# Patient Record
Sex: FEMALE | Race: WHITE | NOT HISPANIC OR LATINO | Employment: OTHER | URBAN - METROPOLITAN AREA
[De-identification: names, ages, dates, MRNs, and addresses within clinical notes are randomized per-mention and may not be internally consistent; named-entity substitution may affect disease eponyms.]

---

## 2023-09-11 ENCOUNTER — HOSPITAL ENCOUNTER (EMERGENCY)
Facility: HOSPITAL | Age: 88
Discharge: HOME | End: 2023-09-11
Attending: EMERGENCY MEDICINE
Payer: MEDICARE

## 2023-09-11 VITALS
DIASTOLIC BLOOD PRESSURE: 83 MMHG | HEIGHT: 65 IN | OXYGEN SATURATION: 99 % | SYSTOLIC BLOOD PRESSURE: 187 MMHG | TEMPERATURE: 98 F | WEIGHT: 147.19 LBS | HEART RATE: 71 BPM | RESPIRATION RATE: 16 BRPM | BODY MASS INDEX: 24.52 KG/M2

## 2023-09-11 DIAGNOSIS — T07.XXXA ABRASIONS OF MULTIPLE SITES: ICD-10-CM
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
## 2023-09-11 DIAGNOSIS — S02.31XA CLOSED FRACTURE OF RIGHT ORBITAL FLOOR, INITIAL ENCOUNTER: Primary | ICD-10-CM

## 2023-09-11 DIAGNOSIS — H11.31 SCLERAL HEMORRHAGE, RIGHT: ICD-10-CM

## 2023-09-11 DIAGNOSIS — M25.561 RIGHT KNEE PAIN: ICD-10-CM

## 2023-09-11 DIAGNOSIS — S00.83XA FACIAL CONTUSION, INITIAL ENCOUNTER: ICD-10-CM

## 2023-09-11 PROCEDURE — 90715 TDAP VACCINE 7 YRS/> IM: CPT | Performed by: NURSE PRACTITIONER

## 2023-09-11 PROCEDURE — 99285 EMERGENCY DEPT VISIT HI MDM: CPT | Mod: 25

## 2023-09-11 PROCEDURE — 90471 IMMUNIZATION ADMIN: CPT | Performed by: NURSE PRACTITIONER

## 2023-09-11 PROCEDURE — 63600175 PHARM REV CODE 636 W HCPCS: Performed by: NURSE PRACTITIONER

## 2023-09-11 RX ORDER — CLINDAMYCIN HYDROCHLORIDE 300 MG/1
300 CAPSULE ORAL 3 TIMES DAILY
Qty: 21 CAPSULE | Refills: 0 | Status: SHIPPED | OUTPATIENT
Start: 2023-09-11 | End: 2023-09-18

## 2023-09-11 RX ADMIN — TETANUS TOXOID, REDUCED DIPHTHERIA TOXOID AND ACELLULAR PERTUSSIS VACCINE, ADSORBED 0.5 ML: 5; 2.5; 8; 8; 2.5 SUSPENSION INTRAMUSCULAR at 05:09

## 2023-09-11 NOTE — FIRST PROVIDER EVALUATION
Medical screening examination initiated.  I have conducted a focused provider triage encounter, findings are as follows:    Brief history of present illness:  right hand pain, right knee pain. Face and head pain after fall. Pt takes daily asa. Reports right eye pain    There were no vitals filed for this visit.    Pertinent physical exam:  nad    Brief workup plan:  imaging, further eval    Preliminary workup initiated; this workup will be continued and followed by the physician or advanced practice provider that is assigned to the patient when roomed.

## 2023-09-11 NOTE — ED PROVIDER NOTES
SCRIBE #1 NOTE: I, Marzena Rodriguez, am scribing for, and in the presence of, Belkys Sams MD. I have scribed the entire note.      History      Chief Complaint   Patient presents with    Fall     Pt fell today when her shoe string came loose and she tripped and fell face first. No loss of consciousness. C/o facial pain, right  and left hand pain, right knee pain.        Review of patient's allergies indicates:   Allergen Reactions    Penicillins Anaphylaxis    Ciprofloxacin Nausea Only        HPI   HPI    9/11/2023, 6:30 PM   History obtained from the patient      History of Present Illness: Bita Ann is a 89 y.o. female patient who presents to the Emergency Department for an evaluation because she had a trip and fall today. Pt reports that she was walking on her driveway when she stepped on her shoe lace and fell face first. She states that she hit her face on the concrete. She denies LOC. Pt is on an 81 mg ASA, but does not take any other blood thinners. She c/o bilateral hand pain, a headache, R periorbital swelling, R periorbital pain, epistaxis, R knee pain, and abrasions to the R knee and bilateral hands.  Symptoms are constant and moderate in severity. No mitigating or exacerbating factors reported. Patient denies any nausea, vomiting, neck pain, back pain, weakness, numbness, and all other sxs at this time. No prior Tx reported. No further complaints or concerns at this time.         Arrival mode: Personal vehicle    PCP: No primary care provider on file.       Past Medical History:  History reviewed. No pertinent past medical history.    Past Surgical History:  History reviewed. No pertinent surgical history.      Family History:  History reviewed. No pertinent family history.    Social History:  Social History     Tobacco Use    Smoking status: Never    Smokeless tobacco: Never   Substance and Sexual Activity    Alcohol use: Never    Drug use: Never    Sexual activity: Not on file       ROS   Review  of Systems   Constitutional:  Negative for fever.   HENT:  Positive for facial swelling (R periorbital) and nosebleeds. Negative for sore throat.         (+) R periorbital pain   Respiratory:  Negative for shortness of breath.    Cardiovascular:  Negative for chest pain.   Gastrointestinal:  Negative for nausea and vomiting.   Genitourinary:  Negative for dysuria.   Musculoskeletal:  Positive for arthralgias (bilateral hand and R knee). Negative for back pain and neck pain.   Skin:  Positive for wound (abrasions to the R knee and bilateral hands). Negative for rash.   Neurological:  Positive for headaches. Negative for weakness and numbness.        (-) LOC   Hematological:  Does not bruise/bleed easily.   All other systems reviewed and are negative.      Physical Exam      Initial Vitals [09/11/23 1735]   BP Pulse Resp Temp SpO2   (!) 194/70 82 16 98 °F (36.7 °C) 98 %      MAP       --          Physical Exam  Nursing Notes and Vital Signs Reviewed.  Constitutional: Patient is in no acute distress. Well-developed and well-nourished.  Head: There is soft tissue bruising to the R inferior orbital region and upper eyelid with reproducible tenderness. No step-offs. Normocephalic.  Eyes: PERRL. EOM intact. No subconjunctival hemorrhage. Conjunctivae are not pale. No scleral icterus.  ENT: Mucous membranes are moist. Oropharynx is clear and symmetric.  No active epistaxis. There is dried blood in the nares bilaterally.  Neck: Supple. Full ROM. No lymphadenopathy.  Cardiovascular: Regular rate. Regular rhythm. No murmurs, rubs, or gallops. Distal pulses are 2+ and symmetric.  Pulmonary/Chest: No respiratory distress. Clear to auscultation bilaterally. No wheezing or rales.  Abdominal: Soft and non-distended.  There is no tenderness.  No rebound, guarding, or rigidity.  Musculoskeletal: Moves all extremities. No obvious deformities. No edema. No bony deformities of the R knee or bilateral hands.  Skin: There are small  "abrasions to the R knee and bilateral hands.  Neurological:  Alert, awake, and appropriate.  Normal speech.  No acute focal neurological deficits are appreciated.  Psychiatric: Normal affect. Good eye contact. Appropriate in content.    ED Course    Procedures  ED Vital Signs:  Vitals:    09/11/23 1735 09/11/23 1843 09/11/23 1845   BP: (!) 194/70 (!) 187/83    Pulse: 82 71    Resp: 16     Temp: 98 °F (36.7 °C)     TempSrc: Oral     SpO2: 98% 99%    Weight: 66.7 kg (147 lb 2.5 oz)     Height:   5' 5" (1.651 m)       Abnormal Lab Results:  Labs Reviewed - No data to display       Imaging Results:  Imaging Results              X-Ray Knee Complete 4 Or More Views Right (Final result)  Result time 09/11/23 18:53:39      Final result by Greg Parker MD (09/11/23 18:53:39)                   Impression:      As above      Electronically signed by: Greg Parker  Date:    09/11/2023  Time:    18:53               Narrative:    EXAMINATION:  XR KNEE COMP 4 OR MORE VIEWS RIGHT    CLINICAL HISTORY:  Pain in right knee    TECHNIQUE:  AP, lateral, and Merchant views of the right knee were performed.    COMPARISON:  None    FINDINGS:  Tricompartment degenerative joint disease.  No acute fracture or dislocation.  Soft tissue prominence.                                       X-Ray Hand 3 View Bilateral (Final result)  Result time 09/11/23 18:41:47      Final result by Greg Parker MD (09/11/23 18:41:47)                   Impression:      No acute abnormality.  Senescent changes including osteopenia and degenerative joint disease      Electronically signed by: Greg Parker  Date:    09/11/2023  Time:    18:41               Narrative:    EXAMINATION:  XR HAND COMPLETE 3 VIEWS BILATERAL    CLINICAL HISTORY:  XR HAND COMPLETE 3 VIEWS BILATERAL    COMPARISON:  None    FINDINGS:  Multiple radiographic views  were obtained.    No evidence of acute fracture or dislocation.  A moderate degenerative joint disease particularly 1st " carpal/metacarpal and thumb interphalangeal joints.  Senescent changes including decreased bone mineral density.                                       CT Head Without Contrast (Final result)  Result time 09/11/23 18:18:04      Final result by Greg Parker MD (09/11/23 18:18:04)                   Impression:      As above    All CT scans   are performed using dose optimization techniques including the following: automated exposure control; adjustment of the mA and/or kV; use of iterative reconstruction technique.  Dose modulation was employed for ALARA by means of: Automated exposure control; adjustment of the mA and/or kV according to patient size (this includes techniques or standardized protocols for targeted exams where dose is matched to indication/reason for exam; i.e. extremities or head); and/or use of iterative reconstructive technique.      Electronically signed by: Greg Parker  Date:    09/11/2023  Time:    18:18               Narrative:    EXAMINATION:  CT HEAD WITHOUT CONTRAST    CLINICAL HISTORY:  Facial trauma, blunt;    TECHNIQUE:  Low dose axial CT images obtained throughout the head without intravenous contrast. Sagittal and coronal reconstructions were performed.    COMPARISON:  None.    FINDINGS:  Inferior orbital floor fracture.  Atrophy and chronic white matter changes.  No hemorrhage mass effect or midline shift.  Hemorrhagic material in the right maxillary sinus.                                       CT Maxillofacial Without Contrast (Final result)  Result time 09/11/23 18:21:53      Final result by Greg Parker MD (09/11/23 18:21:53)                   Impression:      As above      Electronically signed by: Greg Parker  Date:    09/11/2023  Time:    18:21               Narrative:    EXAMINATION:  CT MAXILLOFACIAL WITHOUT CONTRAST    CLINICAL HISTORY:  Facial trauma, blunt;    TECHNIQUE:  Low dose axial images, sagittal and coronal reformations were obtained through the face.   Contrast was not administered.    COMPARISON:  None    FINDINGS:  Comminuted fracture of the inferior lateral orbital wall.  Correlate clinically for entrapment of the right inferior rectus muscle.  Hemorrhagic material in the right maxillary sinus.  Atherosclerotic changes noted.                                              The Emergency Provider reviewed the vital signs and test results, which are outlined above.    ED Discussion     7:05 PM: Reassessed pt at this time. Discussed with pt all pertinent ED information and results. Discussed pt dx and plan of tx. Gave pt all f/u and return to the ED instructions. All questions and concerns were addressed at this time. Pt expresses understanding of information and instructions, and is comfortable with plan to discharge. Pt is stable for discharge.    I discussed with patient and/or family/caretaker that evaluation in the ED does not suggest any emergent or life threatening medical conditions requiring immediate intervention beyond what was provided in the ED, and I believe patient is safe for discharge.  Regardless, an unremarkable evaluation in the ED does not preclude the development or presence of a serious of life threatening condition. As such, patient was instructed to return immediately for any worsening or change in current symptoms.           ED Medication(s):  Medications   Tdap (BOOSTRIX) vaccine injection 0.5 mL (0.5 mLs Intramuscular Given 9/11/23 1746)     New Prescriptions    CLINDAMYCIN (CLEOCIN) 300 MG CAPSULE    Take 1 capsule (300 mg total) by mouth 3 (three) times daily. for 7 days        Follow-up Information       Otolaryngology. Schedule an appointment as soon as possible for a visit in 2 days.    Specialty: Otolaryngology  Why: Return to the emergency Room, If symptoms worsen  Contact information:  [id number] [facility] [id number] [phone]                             Medical Decision Making    Medical Decision  Making  Head Injury  ICH  Facial Fracture  Knee fracture/sprain    Had trip and fall, landed on face, positive facial contusion, no loc, no vomiting, had epistaxis but resolved, not on blood thinners, has facial contusion and periorbital contusion on exam but no entrapment, no vision changes, imaging reviewed and right inferior orbital wall fx noted, right maxillary sinus hemorrhage noted, no ICH, all other imaging otherwise normal, family at bedside, patient lives independently, no vomiting, GCS 15, no indication for lab work or admission, ENT Referral place, encouraged follow up, reasons to return given.     Amount and/or Complexity of Data Reviewed  Radiology: ordered. Decision-making details documented in ED Course.    Risk  Prescription drug management.                Scribe Attestation:   Scribe #1: I performed the above scribed service and the documentation accurately describes the services I performed. I attest to the accuracy of the note.    Attending:   Physician Attestation Statement for Scribe #1: I, Belkys Sams MD, personally performed the services described in this documentation, as scribed by Marzena Rodriguez, in my presence, and it is both accurate and complete.          Clinical Impression       ICD-10-CM ICD-9-CM   1. Closed fracture of right orbital floor, initial encounter  S02.31XA 802.6   2. Right knee pain  M25.561 719.46   3. Facial contusion, initial encounter  S00.83XA 920   4. Abrasions of multiple sites  T07.XXXA 919.0   5. Scleral hemorrhage, right  H11.31 372.72   6. Fall, initial encounter  W19.XXXA E888.9       Disposition:   Disposition: Discharged  Condition: Stable         Belkys Sams MD  09/11/23 1957

## 2023-09-26 ENCOUNTER — OFFICE VISIT (OUTPATIENT)
Dept: OTOLARYNGOLOGY | Facility: CLINIC | Age: 88
End: 2023-09-26
Payer: MEDICARE

## 2023-09-26 VITALS — WEIGHT: 147.06 LBS | BODY MASS INDEX: 24.47 KG/M2

## 2023-09-26 DIAGNOSIS — S02.31XA CLOSED FRACTURE OF RIGHT ORBITAL FLOOR, INITIAL ENCOUNTER: ICD-10-CM

## 2023-09-26 DIAGNOSIS — J34.2 NASAL SEPTAL DEVIATION: ICD-10-CM

## 2023-09-26 DIAGNOSIS — S02.2XXA CLOSED FRACTURE OF NASAL BONE, INITIAL ENCOUNTER: Primary | ICD-10-CM

## 2023-09-26 PROCEDURE — 99213 OFFICE O/P EST LOW 20 MIN: CPT | Mod: PBBFAC | Performed by: OTOLARYNGOLOGY

## 2023-09-26 PROCEDURE — 99999 PR PBB SHADOW E&M-EST. PATIENT-LVL III: CPT | Mod: PBBFAC,,, | Performed by: OTOLARYNGOLOGY

## 2023-09-26 PROCEDURE — 99999 PR PBB SHADOW E&M-EST. PATIENT-LVL III: ICD-10-PCS | Mod: PBBFAC,,, | Performed by: OTOLARYNGOLOGY

## 2023-09-26 PROCEDURE — 99204 PR OFFICE/OUTPT VISIT, NEW, LEVL IV, 45-59 MIN: ICD-10-PCS | Mod: S$PBB,,, | Performed by: OTOLARYNGOLOGY

## 2023-09-26 PROCEDURE — 99204 OFFICE O/P NEW MOD 45 MIN: CPT | Mod: S$PBB,,, | Performed by: OTOLARYNGOLOGY

## 2023-09-26 RX ORDER — LEVOTHYROXINE SODIUM 25 UG/1
25 TABLET ORAL
COMMUNITY
Start: 2023-08-09

## 2023-09-26 RX ORDER — AMLODIPINE BESYLATE 2.5 MG/1
2.5 TABLET ORAL
COMMUNITY
Start: 2023-07-05

## 2023-09-26 RX ORDER — OMEPRAZOLE 40 MG/1
CAPSULE, DELAYED RELEASE ORAL
COMMUNITY
Start: 2023-02-08

## 2023-09-26 RX ORDER — METOPROLOL SUCCINATE 50 MG/1
50 TABLET, EXTENDED RELEASE ORAL
COMMUNITY
Start: 2023-08-09

## 2023-09-26 RX ORDER — LOSARTAN POTASSIUM 100 MG/1
100 TABLET ORAL
COMMUNITY
Start: 2023-08-09

## 2023-09-26 RX ORDER — HYDROCHLOROTHIAZIDE 25 MG/1
TABLET ORAL
COMMUNITY

## 2023-09-26 NOTE — PROGRESS NOTES
"Referring Provider:    Belkys Sams Md  [address] [place] [address] [place] To [id number]  Subjective:   Patient: Bita Ann [id number], :11/15/1926   Visit date:2023 2:25 PM    Chief Complaint:  Other (Had a fall 2 weeks ago, fell and hit her face , real tender on left side , says she cant blow out of left side , hurts real bad in middle and unable to move it )    HPI:    Prior notes reviewed by myself.  Clinical documentation obtained by nursing staff reviewed.     89-year-old female presents for evaluation of recent blunt facial trauma and associated facial fracture.  She fell and landed on her face 2 weeks ago.  She denies any associated loss of consciousness.  She was subsequently seen in the emergency department and underwent a workup including CT maxillofacial which is below.  She reports congestion that is worse on the left side of her nose but otherwise is able to breathe through her nose.  She has not had any recent epistaxis.  She does report significant continued right-sided facial pain and tenderness.  She denies any significant diplopia or blurry vision.    Objective:     Physical Exam:  Vitals:  Wt 66.7 kg (147 lb 0.8 oz)   BMI 24.47 kg/m²   General appearance:  Well developed, well nourished    Ears:  Otoscopy of external auditory canals and tympanic membranes was normal, clinical speech reception thresholds grossly intact, no mass/lesion of auricle.    Nose:  No masses/lesions of external nose, nasal mucosa, septum deviated 2+ left, and turbinates were within normal limits.    Mouth:  No mass/lesion of lips, teeth, gums, hard/soft palate, tongue, tonsils, or oropharynx.    Neck & Lymphatics:  No cervical lymphadenopathy, no neck mass/crepitus/ asymmetry, trachea is midline, no thyroid enlargement/tenderness/mass.    Neuro:  CN II-IV intact bilaterally, Decreased sensation V2 right side, all other V branches intact, VI-XII intact    [x]  Data Reviewed:    No results found for: "WBC", " ""HGB", "HCT", "MCV", "LABPLAT", "EOSINOPHIL"      [x]  Independent interpretation of test: right orbital floor fx, nasal bone fx, possible septal fx  CT Maxillofacial Without Contrast  Order: [id number]  Status: Final result     Visible to patient: No (inaccessible in Patient Portal)     Next appt: None     0 Result Notes  Details    Reading Physician Reading Date Result Priority   Greg Parker MD  [phone] 9/11/2023 STAT     Narrative & Impression  EXAMINATION:  CT MAXILLOFACIAL WITHOUT CONTRAST     CLINICAL HISTORY:  Facial trauma, blunt;     TECHNIQUE:  Low dose axial images, sagittal and coronal reformations were obtained through the face.  Contrast was not administered.     COMPARISON:  None     FINDINGS:  Comminuted fracture of the inferior lateral orbital wall.  Correlate clinically for entrapment of the right inferior rectus muscle.  Hemorrhagic material in the right maxillary sinus.  Atherosclerotic changes noted.     Impression:     As above        Electronically signed by: Greg Parker  Date:                                            09/11/2023  Time:                                           18:21           Exam Ended: 09/11/23 18:03 Last Resulted: 09/11/23 18:21                   Assessment & Plan:   Closed fracture of nasal bone, initial encounter    Closed fracture of right orbital floor, initial encounter  -     Ambulatory referral/consult to ENT    Nasal septal deviation        We reviewed her history, exam, CT report and images in detail.  She does have a right orbital floor fracture on the images but she does not have any evidence of entrapment.  She has nasal bone fracture as well as a possible traumatic nasal septal fracture on my read of the images.  She has a patent nasal airway and at not see any issues that require surgical intervention.  Recommended saline nasal spray t.i.d. and follow-up with her established ENT, Dr. Sifuentes.      "

## 2024-04-01 ENCOUNTER — HOSPITAL ENCOUNTER (EMERGENCY)
Facility: HOSPITAL | Age: 89
Discharge: HOME | End: 2024-04-01
Attending: STUDENT IN AN ORGANIZED HEALTH CARE EDUCATION/TRAINING PROGRAM
Payer: MEDICARE

## 2024-04-01 VITALS
BODY MASS INDEX: 24.94 KG/M2 | OXYGEN SATURATION: 96 % | WEIGHT: 149.69 LBS | HEIGHT: 65 IN | TEMPERATURE: 98 F | RESPIRATION RATE: 18 BRPM | HEART RATE: 56 BPM | DIASTOLIC BLOOD PRESSURE: 58 MMHG | SYSTOLIC BLOOD PRESSURE: 116 MMHG

## 2024-04-01 DIAGNOSIS — L03.116 CELLULITIS OF LEFT LOWER EXTREMITY: Primary | ICD-10-CM

## 2024-04-01 DIAGNOSIS — I10 HYPERTENSION: ICD-10-CM

## 2024-04-01 LAB
ALBUMIN SERPL BCP-MCNC: 3.7 G/DL (ref 3.5–5.2)
ALP SERPL-CCNC: 118 U/L (ref 55–135)
ALT SERPL W/O P-5'-P-CCNC: 11 U/L (ref 10–44)
ANION GAP SERPL CALC-SCNC: 12 MMOL/L (ref 8–16)
AST SERPL-CCNC: 17 U/L (ref 10–40)
BASOPHILS # BLD AUTO: 0.04 K/UL (ref 0–0.2)
BASOPHILS NFR BLD: 0.6 % (ref 0–1.9)
BILIRUB SERPL-MCNC: 0.4 MG/DL (ref 0.1–1)
BNP SERPL-MCNC: 244 PG/ML (ref 0–99)
BUN SERPL-MCNC: 24 MG/DL (ref 10–30)
CALCIUM SERPL-MCNC: 9.6 MG/DL (ref 8.7–10.5)
CHLORIDE SERPL-SCNC: 110 MMOL/L (ref 95–110)
CO2 SERPL-SCNC: 21 MMOL/L (ref 23–29)
CREAT SERPL-MCNC: 1.4 MG/DL (ref 0.5–1.4)
DIFFERENTIAL METHOD BLD: ABNORMAL
EOSINOPHIL # BLD AUTO: 0 K/UL (ref 0–0.5)
EOSINOPHIL NFR BLD: 0 % (ref 0–8)
ERYTHROCYTE [DISTWIDTH] IN BLOOD BY AUTOMATED COUNT: 13.7 % (ref 11.5–14.5)
EST. GFR  (NO RACE VARIABLE): 34 ML/MIN/1.73 M^2
GLUCOSE SERPL-MCNC: 113 MG/DL (ref 70–110)
HCT VFR BLD AUTO: 34 % (ref 37–48.5)
HCV AB SERPL QL IA: NEGATIVE
HEP C VIRUS HOLD SPECIMEN: NORMAL
HGB BLD-MCNC: 10.9 G/DL (ref 12–16)
HIV 1+2 AB+HIV1 P24 AG SERPL QL IA: NEGATIVE
IMM GRANULOCYTES # BLD AUTO: 0.02 K/UL (ref 0–0.04)
IMM GRANULOCYTES NFR BLD AUTO: 0.3 % (ref 0–0.5)
LYMPHOCYTES # BLD AUTO: 3.1 K/UL (ref 1–4.8)
LYMPHOCYTES NFR BLD: 45.6 % (ref 18–48)
MCH RBC QN AUTO: 29.1 PG (ref 27–31)
MCHC RBC AUTO-ENTMCNC: 32.1 G/DL (ref 32–36)
MCV RBC AUTO: 91 FL (ref 82–98)
MONOCYTES # BLD AUTO: 0.4 K/UL (ref 0.3–1)
MONOCYTES NFR BLD: 6 % (ref 4–15)
NEUTROPHILS # BLD AUTO: 3.3 K/UL (ref 1.8–7.7)
NEUTROPHILS NFR BLD: 47.5 % (ref 38–73)
NRBC BLD-RTO: 0 /100 WBC
PLATELET # BLD AUTO: 327 K/UL (ref 150–450)
PMV BLD AUTO: 8.3 FL (ref 9.2–12.9)
POTASSIUM SERPL-SCNC: 3.9 MMOL/L (ref 3.5–5.1)
PROT SERPL-MCNC: 7.4 G/DL (ref 6–8.4)
RBC # BLD AUTO: 3.75 M/UL (ref 4–5.4)
SODIUM SERPL-SCNC: 143 MMOL/L (ref 136–145)
WBC # BLD AUTO: 6.88 K/UL (ref 3.9–12.7)

## 2024-04-01 PROCEDURE — 80053 COMPREHEN METABOLIC PANEL: CPT | Performed by: NURSE PRACTITIONER

## 2024-04-01 PROCEDURE — 85025 COMPLETE CBC W/AUTO DIFF WBC: CPT | Performed by: NURSE PRACTITIONER

## 2024-04-01 PROCEDURE — 93010 ELECTROCARDIOGRAM REPORT: CPT | Mod: ,,, | Performed by: INTERNAL MEDICINE

## 2024-04-01 PROCEDURE — 99284 EMERGENCY DEPT VISIT MOD MDM: CPT | Mod: 25

## 2024-04-01 PROCEDURE — 93005 ELECTROCARDIOGRAM TRACING: CPT

## 2024-04-01 PROCEDURE — 25000003 PHARM REV CODE 250: Performed by: STUDENT IN AN ORGANIZED HEALTH CARE EDUCATION/TRAINING PROGRAM

## 2024-04-01 PROCEDURE — 87389 HIV-1 AG W/HIV-1&-2 AB AG IA: CPT | Performed by: EMERGENCY MEDICINE

## 2024-04-01 PROCEDURE — 83880 ASSAY OF NATRIURETIC PEPTIDE: CPT | Performed by: NURSE PRACTITIONER

## 2024-04-01 PROCEDURE — 86803 HEPATITIS C AB TEST: CPT | Performed by: EMERGENCY MEDICINE

## 2024-04-01 PROCEDURE — 25000003 PHARM REV CODE 250: Performed by: NURSE PRACTITIONER

## 2024-04-01 RX ORDER — SULFAMETHOXAZOLE AND TRIMETHOPRIM 800; 160 MG/1; MG/1
1 TABLET ORAL
Status: COMPLETED | OUTPATIENT
Start: 2024-04-01 | End: 2024-04-01

## 2024-04-01 RX ORDER — CLONIDINE HYDROCHLORIDE 0.2 MG/1
0.2 TABLET ORAL
Status: COMPLETED | OUTPATIENT
Start: 2024-04-01 | End: 2024-04-01

## 2024-04-01 RX ORDER — SULFAMETHOXAZOLE AND TRIMETHOPRIM 800; 160 MG/1; MG/1
1 TABLET ORAL 2 TIMES DAILY
Qty: 14 TABLET | Refills: 0 | Status: SHIPPED | OUTPATIENT
Start: 2024-04-01 | End: 2024-04-08

## 2024-04-01 RX ADMIN — SULFAMETHOXAZOLE AND TRIMETHOPRIM 1 TABLET: 800; 160 TABLET ORAL at 10:04

## 2024-04-01 RX ADMIN — CLONIDINE HYDROCHLORIDE 0.2 MG: 0.2 TABLET ORAL at 09:04

## 2024-04-02 LAB
OHS QRS DURATION: 118 MS
OHS QTC CALCULATION: 489 MS

## 2024-04-02 NOTE — ED PROVIDER NOTES
ED Provider Note - 4/1/2024    History     Chief Complaint   Patient presents with    Leg Pain     Patient presents to ED with c/o BLE pain, swelling and scaling rash x 2 weeks.        The history is provided by the patient.        Bita Ann is a 89 y.o. year old female with past medical and surgical history as seen below, presenting with chief complaint of BLE pain which onset two weeks ago. Symptoms are constant and moderate in severity. Scaly abscess and erythema are present on the anterior LLE. Pt. went to a dermatologist who proscribed a topical ointment which was unsuccessful in subsiding symptoms. Irritation and pain is exacerbated after a fall where she scraped some skin off. The patient adds that she has chronic swelling to the BLE. She has tried wearing compression stockings, which she did not tolerate due to pain. No further complaints or concerns at this time.       No past medical history on file.  No past surgical history on file.      No family history on file.  Social History     Tobacco Use    Smoking status: Never    Smokeless tobacco: Never   Substance Use Topics    Alcohol use: Never    Drug use: Never     Social Determinants of Health with Concerns     Alcohol Use: Not on file   Financial Resource Strain: Not on file   Food Insecurity: Not on file   Transportation Needs: Not on file   Physical Activity: Not on file   Stress: Not on file   Social Connections: Not on file   Housing Stability: Not on file   Depression: Not on file      Review of patient's allergies indicates:   Allergen Reactions    Penicillins Anaphylaxis    Ciprofloxacin Nausea Only       Review of Systems     A full Review of Systems (ROS) was performed and was negative unless otherwise stated in the HPI.      Physical Exam     Vitals:    04/01/24 2039 04/01/24 2150 04/01/24 2241   BP: (S) (!) 237/98  Comment: 227/112 Initially (!) 185/80 (!) 116/58   BP Location: Left arm Left arm Left arm   Patient Position: Sitting  "Sitting Sitting   Pulse: 69 (!) 58 (!) 56   Resp: 18 18 18   Temp: 97.9 °F (36.6 °C)     TempSrc: Oral     SpO2: 100% 98% 96%   Weight: 67.9 kg (149 lb 11.1 oz)     Height: 5' 5" (1.651 m)          Physical Exam    Nursing note and vitals reviewed.  Constitutional: She appears well-developed and well-nourished. No distress.   HENT:   Head: Normocephalic and atraumatic.   Right Ear: External ear normal.   Left Ear: External ear normal.   Nose: Nose normal.   Eyes: Conjunctivae and EOM are normal. Pupils are equal, round, and reactive to light.   Neck: Neck supple.   Normal range of motion.  Cardiovascular:  Normal rate and regular rhythm.           Pulmonary/Chest: Breath sounds normal. No respiratory distress.   Musculoskeletal:         General: Normal range of motion.      Cervical back: Normal range of motion and neck supple.      Right lower le+ Edema present.      Left lower le+ Edema present.     Neurological: She is alert and oriented to person, place, and time. She has normal strength. No cranial nerve deficit or sensory deficit. GCS eye subscore is 4. GCS verbal subscore is 5. GCS motor subscore is 6.   Skin: Skin is warm and dry.   Scaly rash over the anterior LLE with erythematous base and weeping   Psychiatric: She has a normal mood and affect. Thought content normal.         Lab Results- Independently reviewed by myself      Labs Reviewed   CBC W/ AUTO DIFFERENTIAL - Abnormal; Notable for the following components:       Result Value    RBC 3.75 (*)     Hemoglobin 10.9 (*)     Hematocrit 34.0 (*)     MPV 8.3 (*)     All other components within normal limits    Narrative:     Release to patient->Immediate   COMPREHENSIVE METABOLIC PANEL - Abnormal; Notable for the following components:    CO2 21 (*)     Glucose 113 (*)     eGFR 34 (*)     All other components within normal limits    Narrative:     Release to patient->Immediate   B-TYPE NATRIURETIC PEPTIDE - Abnormal; Notable for the following " components:     (*)     All other components within normal limits    Narrative:     Release to patient->Immediate   HIV 1 / 2 ANTIBODY    Narrative:     Release to patient->Immediate   HEPATITIS C ANTIBODY    Narrative:     Release to patient->Immediate   HEP C VIRUS HOLD SPECIMEN    Narrative:     Release to patient->Immediate           Imaging     Imaging Results    None           EKG Readings: (Independently Interpreted)   Interpretation time: 20:39  Rate: 71 BPM  Rhythm: normal sinus rhythm  Interpretation: Incomplete right bundle branch block. Left anterior fascicular block. No STEMI.               ED Course         Procedures         Orders Placed This Encounter    CBC auto differential    Comprehensive metabolic panel    Brain natriuretic peptide    HIV 1/2 Ag/Ab (4th Gen)    Hepatitis C Antibody    HCV Virus Hold Specimen    Apply ace wrap    EKG 12-lead    Saline lock IV    cloNIDine tablet 0.2 mg    sulfamethoxazole-trimethoprim 800-160mg per tablet 1 tablet    sulfamethoxazole-trimethoprim 800-160mg (BACTRIM DS) 800-160 mg Tab                      Medical Decision Making       The patient's list of active medical problems, social history, medications, and allergies as documented per RN staff has been reviewed.           Medical Decision Making  This patient presents for evaluation of left LE redness and pain. Overall, patient is nontoxic appearing with stable VS. No lymphangitic spread visible. No fluid pockets or fluctuance concerning for abscess noted. S/S are most concerning for cellulitis. Full DDX includes abscess, DVT, burn, MSK pain, and other nonemergent causes of rash.    Patient prescribed Bactrim for symptoms of likely cellulitis.    Close follow-up with PCP advised. Return to the ED for reevaluation should symptoms worsen, return, or change in character.      Amount and/or Complexity of Data Reviewed  External Data Reviewed: ECG and notes.  Labs: ordered. Decision-making details  documented in ED Course.  ECG/medicine tests: ordered and independent interpretation performed. Decision-making details documented in ED Course.  Discussion of management or test interpretation with external provider(s):   3:27 AM: Reassessed pt at this time. Discussed with pt all pertinent ED information and results. Discussed pt dx and plan of tx. Gave pt all f/u and return to the ED instructions. All questions and concerns were addressed at this time. Pt expresses understanding of information and instructions, and is comfortable with plan to discharge. Pt is stable for discharge.    I discussed with patient and/or family/caretaker that evaluation in the ED does not suggest any emergent or life threatening medical conditions requiring immediate intervention beyond what was provided in the ED, and I believe patient is safe for discharge.  Regardless, an unremarkable evaluation in the ED does not preclude the development or presence of a serious of life threatening condition. As such, patient was instructed to return immediately for any worsening or change in current symptoms.        Risk  Prescription drug management.              SCRIBE #1 NOTE: I, Chi Xavier, am scribing for, and in the presence of,  Fabrice Villalobos MD. I have scribed the entire note.          ED Prescriptions       Medication Sig Dispense Start Date End Date Auth. Provider    sulfamethoxazole-trimethoprim 800-160mg (BACTRIM DS) 800-160 mg Tab Take 1 tablet by mouth 2 (two) times daily. for 7 days 14 tablet 4/1/2024 4/8/2024 Fabrice Villalobos MD              Clinical Impression       Follow-up Information       Follow up With Specialties Details Why Contact Info    Primary care provider of your choice  Schedule an appointment as soon as possible for a visit in 3 days For follow-up on today's visit.     [facility] - Emergency Dept. Emergency Medicine Go to  As needed, If symptoms worsen [id number]  [facility]  [id number]  [phone]            Referrals:  No orders of the defined types were placed in this encounter.      Disposition   ED Disposition Condition    Discharge Stable            Diagnosis    ICD-10-CM ICD-9-CM   1. Cellulitis of left lower extremity  L03.116 682.6   2. Hypertension  I10 401.9           Fabrice Villalobos MD        04/01/2024          DISCLAIMER: This note was prepared with [employer] voice recognition transcription software. Garbled syntax, mangled pronouns, and other bizarre constructions may be attributed to that software system.        Fabrice Villalobos MD  04/04/24 0109

## 2024-04-02 NOTE — FIRST PROVIDER EVALUATION
Medical screening examination initiated.  I have conducted a focused provider triage encounter, findings are as follows:    Brief history of present illness:  Complains redness, drainage, and swelling to bilateral lower extremity for 2 weeks.    There were no vitals filed for this visit.    Pertinent physical exam:  Drainage, swelling, and erythema to bilateral lower extremities.  Hypertensive.    Brief workup plan:  Labs    Preliminary workup initiated; this workup will be continued and followed by the physician or advanced practice provider that is assigned to the patient when roomed.

## 2025-05-12 ENCOUNTER — HOSPITAL ENCOUNTER (EMERGENCY)
Facility: HOSPITAL | Age: OVER 89
Discharge: HOME | End: 2025-05-12
Attending: EMERGENCY MEDICINE
Payer: MEDICARE

## 2025-05-12 VITALS
OXYGEN SATURATION: 98 % | HEART RATE: 71 BPM | TEMPERATURE: 99 F | DIASTOLIC BLOOD PRESSURE: 78 MMHG | HEIGHT: 62 IN | SYSTOLIC BLOOD PRESSURE: 168 MMHG | BODY MASS INDEX: 27.38 KG/M2 | RESPIRATION RATE: 18 BRPM

## 2025-05-12 DIAGNOSIS — R05.9 COUGH, UNSPECIFIED TYPE: ICD-10-CM

## 2025-05-12 DIAGNOSIS — J30.9 ALLERGIC RHINITIS, UNSPECIFIED SEASONALITY, UNSPECIFIED TRIGGER: Primary | ICD-10-CM

## 2025-05-12 DIAGNOSIS — Z13.6 SCREENING FOR CARDIOVASCULAR CONDITION: ICD-10-CM

## 2025-05-12 DIAGNOSIS — R03.0 ELEVATED BLOOD PRESSURE READING: ICD-10-CM

## 2025-05-12 LAB
ABSOLUTE EOSINOPHIL (OHS): 0.12 K/UL
ABSOLUTE MONOCYTE (OHS): 0.38 K/UL (ref 0.3–1)
ABSOLUTE NEUTROPHIL COUNT (OHS): 3.37 K/UL (ref 1.8–7.7)
ALBUMIN SERPL BCP-MCNC: 3.8 G/DL (ref 3.5–5.2)
ALP SERPL-CCNC: 86 UNIT/L (ref 40–150)
ALT SERPL W/O P-5'-P-CCNC: 8 UNIT/L (ref 10–44)
AMM URATE CRY UR QL COMP ASSIST: ABNORMAL
AMORPH CRY UR QL COMP ASSIST: ABNORMAL
ANION GAP (OHS): 11 MMOL/L (ref 8–16)
AST SERPL-CCNC: 15 UNIT/L (ref 11–45)
BACTERIA #/AREA URNS AUTO: ABNORMAL /HPF
BASOPHILS # BLD AUTO: 0.05 K/UL
BASOPHILS NFR BLD AUTO: 0.7 %
BILIRUB SERPL-MCNC: 0.3 MG/DL (ref 0.1–1)
BILIRUB UR QL STRIP.AUTO: NEGATIVE
BUN SERPL-MCNC: 20 MG/DL (ref 10–30)
CA CARBONATE CRY UR QL COMP ASSIST: ABNORMAL
CA PHOS CRY UR QL COMP ASSIST: ABNORMAL
CALCIUM SERPL-MCNC: 9.4 MG/DL (ref 8.7–10.5)
CAOX CRY UR QL COMP ASSIST: ABNORMAL
CHLORIDE SERPL-SCNC: 105 MMOL/L (ref 95–110)
CLARITY UR: CLEAR
CO2 SERPL-SCNC: 23 MMOL/L (ref 23–29)
COLOR UR AUTO: YELLOW
CREAT SERPL-MCNC: 1.3 MG/DL (ref 0.5–1.4)
EPITH CASTS #/AREA UR COMP ASSIST: 0 /LPF (ref ?–0)
ERYTHROCYTE [DISTWIDTH] IN BLOOD BY AUTOMATED COUNT: 13.1 % (ref 11.5–14.5)
FATTY CASTS UR QL COMP ASSIST: 0 /LPF (ref ?–0)
GFR SERPLBLD CREATININE-BSD FMLA CKD-EPI: 37 ML/MIN/1.73/M2
GLUCOSE SERPL-MCNC: 122 MG/DL (ref 70–110)
GLUCOSE UR QL STRIP: NEGATIVE
GRAN CASTS UR QL COMP ASSIST: 0 /LPF (ref ?–0)
HCT VFR BLD AUTO: 38.7 % (ref 37–48.5)
HCV AB SERPL QL IA: NEGATIVE
HGB BLD-MCNC: 12.4 GM/DL (ref 12–16)
HGB UR QL STRIP: NEGATIVE
HIV 1+2 AB+HIV1 P24 AG SERPL QL IA: NEGATIVE
HOLD SPECIMEN: NORMAL
HOLD SPECIMEN: NORMAL
HYALINE CASTS UR QL AUTO: 1 /LPF (ref 0–1)
IMM GRANULOCYTES # BLD AUTO: 0.01 K/UL (ref 0–0.04)
IMM GRANULOCYTES NFR BLD AUTO: 0.1 % (ref 0–0.5)
INFLUENZA A MOLECULAR (OHS): NEGATIVE
INFLUENZA B MOLECULAR (OHS): NEGATIVE
KETONES UR QL STRIP: NEGATIVE
LACTATE SERPL-SCNC: 1.2 MMOL/L (ref 0.5–2.2)
LEUKOCYTE ESTERASE UR QL STRIP: ABNORMAL
LYMPHOCYTES # BLD AUTO: 3.47 K/UL (ref 1–4.8)
MCH RBC QN AUTO: 28.9 PG (ref 27–31)
MCHC RBC AUTO-ENTMCNC: 32 G/DL (ref 32–36)
MCV RBC AUTO: 90 FL (ref 82–98)
MICROSCOPIC COMMENT: ABNORMAL
NITRITE UR QL STRIP: NEGATIVE
NON-SQ EPI CELLS #/AREA URNS AUTO: 0 /HPF
NUCLEATED RBC (/100WBC) (OHS): 0 /100 WBC
OTHER ELEMENTS URNS MICRO: ABNORMAL
PH UR STRIP: 6 [PH]
PLATELET # BLD AUTO: 323 K/UL (ref 150–450)
PMV BLD AUTO: 8.6 FL (ref 9.2–12.9)
POTASSIUM SERPL-SCNC: 4.4 MMOL/L (ref 3.5–5.1)
PROT SERPL-MCNC: 8 GM/DL (ref 6–8.4)
PROT UR QL STRIP: ABNORMAL
RBC # BLD AUTO: 4.29 M/UL (ref 4–5.4)
RBC #/AREA URNS AUTO: <1 /HPF (ref 0–4)
RBC CASTS UR QL COMP ASSIST: 0 /LPF (ref ?–0)
RELATIVE EOSINOPHIL (OHS): 1.6 %
RELATIVE LYMPHOCYTE (OHS): 46.9 % (ref 18–48)
RELATIVE MONOCYTE (OHS): 5.1 % (ref 4–15)
RELATIVE NEUTROPHIL (OHS): 45.6 % (ref 38–73)
SARS-COV-2 RDRP RESP QL NAA+PROBE: NEGATIVE
SODIUM SERPL-SCNC: 139 MMOL/L (ref 136–145)
SP GR UR STRIP: 1.02
SQUAMOUS #/AREA URNS AUTO: 2 /HPF
TRI-PHOS CRY UR QL COMP ASSIST: ABNORMAL
TRICHOMONAS UR QL COMP ASSIST: ABNORMAL /HPF
UNSPECIFIED CRY UR QL COMP ASSIST: ABNORMAL
URATE CRY UR QL COMP ASSIST: ABNORMAL
UROBILINOGEN UR STRIP-ACNC: NEGATIVE EU/DL
WAXY CASTS UR QL COMP ASSIST: 0 /LPF (ref ?–0)
WBC # BLD AUTO: 7.4 K/UL (ref 3.9–12.7)
WBC #/AREA URNS AUTO: 10 /HPF (ref 0–5)
WBC CASTS UR QL COMP ASSIST: 0 /LPF (ref ?–0)
WBC CLUMPS UR QL AUTO: ABNORMAL
YEAST UR QL AUTO: ABNORMAL /HPF

## 2025-05-12 PROCEDURE — 80053 COMPREHEN METABOLIC PANEL: CPT | Performed by: NURSE PRACTITIONER

## 2025-05-12 PROCEDURE — 63600175 PHARM REV CODE 636 W HCPCS: Performed by: NURSE PRACTITIONER

## 2025-05-12 PROCEDURE — U0002 COVID-19 LAB TEST NON-CDC: HCPCS | Performed by: NURSE PRACTITIONER

## 2025-05-12 PROCEDURE — 83605 ASSAY OF LACTIC ACID: CPT | Performed by: NURSE PRACTITIONER

## 2025-05-12 PROCEDURE — 63600175 PHARM REV CODE 636 W HCPCS: Performed by: EMERGENCY MEDICINE

## 2025-05-12 PROCEDURE — 87040 BLOOD CULTURE FOR BACTERIA: CPT | Performed by: NURSE PRACTITIONER

## 2025-05-12 PROCEDURE — 96361 HYDRATE IV INFUSION ADD-ON: CPT

## 2025-05-12 PROCEDURE — 93010 ELECTROCARDIOGRAM REPORT: CPT | Mod: ,,, | Performed by: INTERNAL MEDICINE

## 2025-05-12 PROCEDURE — 87389 HIV-1 AG W/HIV-1&-2 AB AG IA: CPT | Performed by: EMERGENCY MEDICINE

## 2025-05-12 PROCEDURE — 96374 THER/PROPH/DIAG INJ IV PUSH: CPT

## 2025-05-12 PROCEDURE — 86803 HEPATITIS C AB TEST: CPT | Performed by: EMERGENCY MEDICINE

## 2025-05-12 PROCEDURE — 99285 EMERGENCY DEPT VISIT HI MDM: CPT | Mod: 25

## 2025-05-12 PROCEDURE — 93005 ELECTROCARDIOGRAM TRACING: CPT

## 2025-05-12 PROCEDURE — 87502 INFLUENZA DNA AMP PROBE: CPT | Performed by: NURSE PRACTITIONER

## 2025-05-12 PROCEDURE — 25000003 PHARM REV CODE 250: Performed by: EMERGENCY MEDICINE

## 2025-05-12 PROCEDURE — 85025 COMPLETE CBC W/AUTO DIFF WBC: CPT | Performed by: NURSE PRACTITIONER

## 2025-05-12 PROCEDURE — 81003 URINALYSIS AUTO W/O SCOPE: CPT | Performed by: NURSE PRACTITIONER

## 2025-05-12 RX ORDER — PREDNISONE 20 MG/1
40 TABLET ORAL
Status: COMPLETED | OUTPATIENT
Start: 2025-05-12 | End: 2025-05-12

## 2025-05-12 RX ORDER — ONDANSETRON HYDROCHLORIDE 2 MG/ML
4 INJECTION, SOLUTION INTRAVENOUS
Status: COMPLETED | OUTPATIENT
Start: 2025-05-12 | End: 2025-05-12

## 2025-05-12 RX ORDER — OXYMETAZOLINE HCL 0.05 %
2 SPRAY, NON-AEROSOL (ML) NASAL
Status: COMPLETED | OUTPATIENT
Start: 2025-05-12 | End: 2025-05-12

## 2025-05-12 RX ORDER — ONDANSETRON HYDROCHLORIDE 2 MG/ML
4 INJECTION, SOLUTION INTRAVENOUS
Status: DISCONTINUED | OUTPATIENT
Start: 2025-05-12 | End: 2025-05-12

## 2025-05-12 RX ORDER — BENZONATATE 100 MG/1
200 CAPSULE ORAL 3 TIMES DAILY PRN
Qty: 20 CAPSULE | Refills: 0 | Status: SHIPPED | OUTPATIENT
Start: 2025-05-12 | End: 2025-05-22

## 2025-05-12 RX ORDER — AMLODIPINE BESYLATE 5 MG/1
10 TABLET ORAL
Status: COMPLETED | OUTPATIENT
Start: 2025-05-12 | End: 2025-05-12

## 2025-05-12 RX ORDER — PREDNISONE 20 MG/1
40 TABLET ORAL DAILY
Qty: 8 TABLET | Refills: 0 | Status: SHIPPED | OUTPATIENT
Start: 2025-05-12 | End: 2025-05-16

## 2025-05-12 RX ADMIN — SODIUM CHLORIDE 500 ML: 9 INJECTION, SOLUTION INTRAVENOUS at 07:05

## 2025-05-12 RX ADMIN — Medication 2 SPRAY: at 07:05

## 2025-05-12 RX ADMIN — AMLODIPINE BESYLATE 10 MG: 5 TABLET ORAL at 08:05

## 2025-05-12 RX ADMIN — ONDANSETRON 4 MG: 2 INJECTION INTRAMUSCULAR; INTRAVENOUS at 07:05

## 2025-05-12 RX ADMIN — PREDNISONE 40 MG: 20 TABLET ORAL at 08:05

## 2025-05-12 NOTE — FIRST PROVIDER EVALUATION
"Medical screening examination initiated.  I have conducted a focused provider triage encounter, findings are as follows:    Brief history of present illness:  reports cough and feeling weak. Reports decreased appetite with congestion and fever      Vitals:    05/12/25 1801   BP: (!) 175/82   BP Location: Right arm   Pulse: 85   Resp: 16   Temp: 99.2 °F (37.3 °C)   TempSrc: Oral   SpO2: (!) 94%   Height: 5' 2" (1.575 m)       Pertinent physical exam:  nad    Brief workup plan:  labs, meds, imaging, further eval     Preliminary workup initiated; this workup will be continued and followed by the physician or advanced practice provider that is assigned to the patient when roomed.  "

## 2025-05-13 LAB
OHS QRS DURATION: 126 MS
OHS QTC CALCULATION: 473 MS

## 2025-05-13 NOTE — DISCHARGE INSTRUCTIONS
Prednisone as prescribed.  Use Tessalon Perles for cough.  You may take a Zyrtec, Allegra or Claritin daily as an antihistamine.  This should help you with your symptoms.  Follow up with her doctor in 1-2 days for re-evaluation and return as needed for any worsening symptoms, problems, questions or concerns

## 2025-05-13 NOTE — ED PROVIDER NOTES
"SCRIBE #1 NOTE: I, Alphonso Osorio, am scribing for, and in the presence of, Tanner Snider Jr., MD. I have scribed the entire note.       History     Chief Complaint   Patient presents with    General Illness     Pts family states pt has come down with a cold a week ago and since then she has not been eating/ driking appropriately, vomiting, diarrhea, coughing, congestion, and mild fever.     Review of patient's allergies indicates:   Allergen Reactions    Penicillins Anaphylaxis    Ciprofloxacin Nausea Only         History of Present Illness     HPI    5/12/2025, 7:11 PM  History obtained from the patient and family member      History of Present Illness: Bita Ann is a 89 y.o. female patient with no previous PMHx reported who presents to the Emergency Department for evaluation of congestion which began a week ago. Pt reports that she went to [facility] and after returning she had a sore throat. She states that after a couple days of the same sxs she went to [facility] tested for flu and COVID-19. Per family member, pt was brought in because she is concerned of these sxs occurring at "this age." Pt reports that she was coughing up mucus but has subsided since. Pt confirms that her eyes have been watering lately. Per family member, she has not been eating and drinking as normal. Pt denies any hx of diabetes. She confirms hx of hypoglycemia. Symptoms are constant and moderate in severity. No mitigating or exacerbating factors reported. Associated sxs include rhinorrhea, sneezing, cough, weakness, and nausea. Patient denies any vomiting or diarrhea. No prior Tx specified. No further complaints or concerns at this time.       Arrival mode: Personal Transportation    PCP: Belkys Sams MD        Past Medical History:  History reviewed. No pertinent past medical history.    Past Surgical History:  History reviewed. No pertinent surgical history.      Family History:  No family history on " file.    Social History:  Social History     Tobacco Use    Smoking status: Never    Smokeless tobacco: Never   Substance and Sexual Activity    Alcohol use: Never    Drug use: Never    Sexual activity: Not on file        Review of Systems     Review of Systems   Constitutional:  Negative for fever.   HENT:  Positive for congestion, rhinorrhea, sneezing and sore throat.    Respiratory:  Positive for cough. Negative for shortness of breath.    Cardiovascular:  Negative for chest pain.   Gastrointestinal:  Positive for nausea. Negative for diarrhea and vomiting.   Genitourinary:  Negative for dysuria.   Musculoskeletal:  Negative for back pain.   Skin:  Negative for rash.   Neurological:  Positive for weakness.   Hematological:  Does not bruise/bleed easily.      Physical Exam     Initial Vitals [05/12/25 1801]   BP Pulse Resp Temp SpO2   (!) 175/82 85 16 99.2 °F (37.3 °C) (!) 94 %      MAP       --          Physical Exam  Nursing Notes and Vital Signs Reviewed.  Constitutional: Patient is in no acute distress. Well-developed and well-nourished.  Head: Atraumatic. Normocephalic.  Eyes:  EOM intact.  No scleral icterus.  ENT: Mucous membranes are moist.  Atopic faces.  Patient with a enlarged inflamed turbinates with a allergic shiners allergic salute injected conjunctiva OU  Neck:  Full ROM. No JVD.  Cardiovascular: Regular rate. Regular rhythm No murmurs, rubs, or gallops. Distal pulses are 2+ and symmetric  Pulmonary/Chest: No respiratory distress. Clear to auscultation bilaterally. No wheezing or rales.  Equal chest wall rise bilaterally  Abdominal: Soft and non-distended.  There is no tenderness.  No rebound, guarding, or rigidity. Good bowel sounds.  Genitourinary: No CVA tenderness.  No suprapubic tenderness  Musculoskeletal: Moves all extremities. No obvious deformities.  5 x 5 strength in all extremities   Skin: Warm and dry.  Neurological:  Alert, awake, and appropriate.  Normal speech.  No acute focal  "neurological deficits are appreciated.  Two through 12 intact bilaterally.  Psychiatric: Normal affect. Good eye contact. Appropriate in content.       ED Course   Procedures  ED Vital Signs:  Vitals:    05/12/25 1801 05/12/25 1917 05/12/25 1930 05/12/25 1948   BP: (!) 175/82 (!) 145/93 (!) 160/75 (!) 175/80   Pulse: 85 77 74 74   Resp: 16 16 15 16   Temp: 99.2 °F (37.3 °C)      TempSrc: Oral      SpO2: (!) 94% 97% 96% 98%   Height: 5' 2" (1.575 m)       05/12/25 2000   BP: (!) 184/84   Pulse: 74   Resp: 11   Temp:    TempSrc:    SpO2: 97%   Height:        Abnormal Lab Results:  Labs Reviewed   COMPREHENSIVE METABOLIC PANEL - Abnormal       Result Value    Sodium 139      Potassium 4.4      Chloride 105      CO2 23      Glucose 122 (*)     BUN 20      Creatinine 1.3      Calcium 9.4      Protein Total 8.0      Albumin 3.8      Bilirubin Total 0.3      ALP 86      AST 15      ALT 8 (*)     Anion Gap 11      eGFR 37 (*)    URINALYSIS, REFLEX TO URINE CULTURE - Abnormal    Color, UA Yellow      Appearance, UA Clear      pH, UA 6.0      Spec Grav UA 1.025      Protein, UA 1+ (*)     Glucose, UA Negative      Ketones, UA Negative      Bilirubin, UA Negative      Blood, UA Negative      Nitrites, UA Negative      Urobilinogen, UA Negative      Leukocyte Esterase, UA 2+ (*)    CBC WITH DIFFERENTIAL - Abnormal    WBC 7.40      RBC 4.29      HGB 12.4      HCT 38.7      MCV 90      MCH 28.9      MCHC 32.0      RDW 13.1      Platelet Count 323      MPV 8.6 (*)     Nucleated RBC 0      Neut % 45.6      Lymph % 46.9      Mono % 5.1      Eos % 1.6      Basophil % 0.7      Imm Grans % 0.1      Neut # 3.37      Lymph # 3.47      Mono # 0.38      Eos # 0.12      Baso # 0.05      Imm Grans # 0.01     URINALYSIS MICROSCOPIC - Abnormal    RBC, UA <1      WBC, UA 10 (*)     WBC Clumps, UA None      Bacteria, UA None      Yeast, UA None      Squamous Epithelial Cells, UA 2      Non-Squamous Epithelial Cells 0      Hyaline Casts, UA 1   "    Epithelial Casts 0      WBC Casts 0      RBC Casts 0      Granular Casts 0      Fatty Casts, UA 0      Waxy Casts, UA 0      Triple Phosphate Crystals, UA None      Calcium Oxalate Crystals, UA None      Calcium Phosphate Crystal None      Calcium Carbonate Crystal None      Ammonium Urate Crystals None      Uric Acid Crystals, UA None      Amorphous, UA None      Unclassified Crystals None      Trichomonas, UA None      Other, UA None      Microscopic Comment       INFLUENZA A & B BY MOLECULAR - Normal    INFLUENZA A MOLECULAR Negative      INFLUENZA B MOLECULAR  Negative     LACTIC ACID, PLASMA - Normal    Lactic Acid Level 1.2      Narrative:     Falsely low lactic acid results can be found in samples containing >=13.0 mg/dL total bilirubin and/or >=3.5 mg/dL direct bilirubin.    SARS-COV-2 RNA AMPLIFICATION, QUAL - Normal    SARS COV-2 Molecular Negative     HEPATITIS C ANTIBODY - Normal    Hep C Ab Interp Negative     HIV 1 / 2 ANTIBODY - Normal    HIV 1/2 Ag/Ab Negative     CULTURE, BLOOD   CULTURE, BLOOD   CBC W/ AUTO DIFFERENTIAL    Narrative:     The following orders were created for panel order CBC auto differential.  Procedure                               Abnormality         Status                     ---------                               -----------         ------                     CBC with Differential[[id number]]       Abnormal            Final result                 Please view results for these tests on the individual orders.   HEP C VIRUS HOLD SPECIMEN   GREY TOP URINE HOLD        All Lab Results:  Results for orders placed or performed during the hospital encounter of 05/12/25   Comprehensive metabolic panel    Collection Time: 05/12/25  7:00 PM   Result Value Ref Range    Sodium 139 136 - 145 mmol/L    Potassium 4.4 3.5 - 5.1 mmol/L    Chloride 105 95 - 110 mmol/L    CO2 23 23 - 29 mmol/L    Glucose 122 (H) 70 - 110 mg/dL    BUN 20 10 - 30 mg/dL    Creatinine 1.3 0.5 - 1.4 mg/dL    Calcium  9.4 8.7 - 10.5 mg/dL    Protein Total 8.0 6.0 - 8.4 gm/dL    Albumin 3.8 3.5 - 5.2 g/dL    Bilirubin Total 0.3 0.1 - 1.0 mg/dL    ALP 86 40 - 150 unit/L    AST 15 11 - 45 unit/L    ALT 8 (L) 10 - 44 unit/L    Anion Gap 11 8 - 16 mmol/L    eGFR 37 (L) >60 mL/min/1.73/m2   Lactic acid, plasma #1    Collection Time: 05/12/25  7:00 PM   Result Value Ref Range    Lactic Acid Level 1.2 0.5 - 2.2 mmol/L   CBC with Differential    Collection Time: 05/12/25  7:00 PM   Result Value Ref Range    WBC 7.40 3.90 - 12.70 K/uL    RBC 4.29 4.00 - 5.40 M/uL    HGB 12.4 12.0 - 16.0 gm/dL    HCT 38.7 37.0 - 48.5 %    MCV 90 82 - 98 fL    MCH 28.9 27.0 - 31.0 pg    MCHC 32.0 32.0 - 36.0 g/dL    RDW 13.1 11.5 - 14.5 %    Platelet Count 323 150 - 450 K/uL    MPV 8.6 (L) 9.2 - 12.9 fL    Nucleated RBC 0 <=0 /100 WBC    Neut % 45.6 38 - 73 %    Lymph % 46.9 18 - 48 %    Mono % 5.1 4 - 15 %    Eos % 1.6 <=8 %    Basophil % 0.7 <=1.9 %    Imm Grans % 0.1 0.0 - 0.5 %    Neut # 3.37 1.8 - 7.7 K/uL    Lymph # 3.47 1 - 4.8 K/uL    Mono # 0.38 0.3 - 1 K/uL    Eos # 0.12 <=0.5 K/uL    Baso # 0.05 <=0.2 K/uL    Imm Grans # 0.01 0.00 - 0.04 K/uL   Hepatitis C Antibody    Collection Time: 05/12/25  7:00 PM   Result Value Ref Range    Hep C Ab Interp Negative Negative   HIV 1/2 Ag/Ab (4th Gen)    Collection Time: 05/12/25  7:00 PM   Result Value Ref Range    HIV 1/2 Ag/Ab Negative Negative   Influenza A & B by Molecular    Collection Time: 05/12/25  7:18 PM    Specimen: Nasal Swab   Result Value Ref Range    INFLUENZA A MOLECULAR Negative Negative    INFLUENZA B MOLECULAR  Negative Negative   COVID-19 Rapid Screening    Collection Time: 05/12/25  7:18 PM   Result Value Ref Range    SARS COV-2 Molecular Negative Negative   Urinalysis, Reflex to Urine Culture Urine, Clean Catch    Collection Time: 05/12/25  7:48 PM    Specimen: Urine   Result Value Ref Range    Color, UA Yellow Straw, Rosie, Yellow, Light-Orange    Appearance, UA Clear Clear    pH, UA  6.0 5.0 - 8.0    Spec Grav UA 1.025 1.005 - 1.030    Protein, UA 1+ (A) Negative    Glucose, UA Negative Negative    Ketones, UA Negative Negative    Bilirubin, UA Negative Negative    Blood, UA Negative Negative    Nitrites, UA Negative Negative    Urobilinogen, UA Negative <2.0 EU/dL    Leukocyte Esterase, UA 2+ (A) Negative   Urinalysis Microscopic    Collection Time: 05/12/25  7:48 PM   Result Value Ref Range    RBC, UA <1 0 - 4 /HPF    WBC, UA 10 (H) 0 - 5 /HPF    WBC Clumps, UA None None, Rare    Bacteria, UA None None, Rare, Occasional /HPF    Yeast, UA None None /HPF    Squamous Epithelial Cells, UA 2 /HPF    Non-Squamous Epithelial Cells 0 <=0 /HPF    Hyaline Casts, UA 1 0 - 1 /LPF    Epithelial Casts 0 None /lpf    WBC Casts 0 None /LPF    RBC Casts 0 None /LPF    Granular Casts 0 None  /LPF    Fatty Casts, UA 0 None /LPF    Waxy Casts, UA 0 None /lpf    Triple Phosphate Crystals, UA None None, Rare, Occasional, Few, Moderate    Calcium Oxalate Crystals, UA None None, Rare, Occasional, Few, Moderate    Calcium Phosphate Crystal None Rare, None, Moderate, Few, Occasional    Calcium Carbonate Crystal None Occasional, Rare, Few, None, Moderate    Ammonium Urate Crystals None Moderate, Occasional, Rare, Few, None    Uric Acid Crystals, UA None None, Rare, Occasional, Few, Moderate    Amorphous, UA None None, Occasional, Few, Moderate, Rare    Unclassified Crystals None None, Rare, Occasional, Few, Moderate    Trichomonas, UA None None /HPF    Other, UA None None    Microscopic Comment         Imaging Results:  Imaging Results              X-Ray Chest AP Portable (Final result)  Result time 05/12/25 19:53:10      Final result by Reuben Cardenas MD (05/12/25 19:53:10)                   Impression:     No acute cardiopulmonary abnormality.    Finalized on: 5/12/2025 7:53 PM By:  Reuben Cardenas MD  [facility]# [id number]      2025-05-12 19:55:[id number]     [facility]               Narrative:    EXAM:  XR CHEST AP  PORTABLE    CLINICAL HISTORY: Sepsis    COMPARISON: None available.    FINDINGS: No confluent airspace opacity or consolidation.  There is no evidence of pleural effusion, pneumothorax, or other acute pulmonary disease.  The cardiomediastinal silhouette is within normal limits.  Severe mitral annular calcification.  No acute osseous abnormality is evident.   Moderate scattered degenerative change and atherosclerotic disease.                                           The EKG was ordered, reviewed, and independently interpreted by the ED provider.  Interpretation time: 19:13  Rate: 77 BPM  Rhythm: normal sinus rhythm  Interpretation: Right bundle branch block  Left anterior fascicular block. Bifascicular block.  Possible Anterolateral infarct, age undetermined . No STEMI.         The Emergency Provider reviewed the vital signs and test results, which are outlined above.     ED Discussion     8:24 PM: Reassessed pt at this time. Discussed with patient and/or family/caretaker all pertinent ED information and results. Discussed pt dx and plan of tx. Gave the patient all f/u and return to the ED instructions. All questions and concerns were addressed at this time. Patient and/or family/caretaker expresses understanding of information and instructions, and is comfortable with plan to discharge. Pt is stable for discharge.     I discussed with patient and/or family/caretaker that evaluation in the ED does not suggest any emergent or life threatening medical conditions requiring immediate intervention beyond what was provided in the ED, and I believe patient is safe for discharge.  Regardless, an unremarkable evaluation in the ED does not preclude the development or presence of a serious of life threatening condition. As such, I instructed that the patient is to return immediately for any worsening or change in current symptoms.    ED Course as of 05/12/25 2052   Mon May 12, 2025   2005 Cardiac monitor  interpretation  Independent interpretation  Indication:  Generalized weakness  Normal sinus rhythm.  Rate 72.  No STEMI [RT]      ED Course User Index  [RT] Tanner Snider Jr., MD     Medical Decision Making  Differential diagnosis: Pneumonia, cough, shortness of breath, viral syndrome, seasonal allergy    The patient is evaluated history and physical examination.  Patient has a itchy watery eyes with runny nose postnasal drip and cough.  She looks great on physical exam.  Physical examination in his consistent with atopic face ease.  Workup was negative.  Clinically the patient has seasonal allergies and I will treat with steroids Tessalon Perles and antihistamines.  The patient verbalized understanding and agreement with the plan of care and seems reliable.  She is safe for discharge in my opinion    Amount and/or Complexity of Data Reviewed  Independent Historian: friend     Details: Family member at bedside.   Labs: ordered. Decision-making details documented in ED Course.     Details: UA is negative flu is negative COVID negative CMP is benign lactate is normal normal white count normal H&H  Radiology: ordered. Decision-making details documented in ED Course.     Details: Chest x-ray is clear  ECG/medicine tests: ordered and independent interpretation performed. Decision-making details documented in ED Course.     Details: No STEMI    Risk  OTC drugs.  Prescription drug management.  Decision regarding hospitalization.  Diagnosis or treatment significantly limited by social determinants of health.  Risk Details: Social determinants: Markedly advanced age                ED Medication(s):  Medications   ondansetron injection 4 mg (4 mg Intravenous Given 5/12/25 1927)   oxymetazoline 0.05 % nasal spray 2 spray (2 sprays Each Nostril Given 5/12/25 1927)   sodium chloride 0.9% bolus 500 mL 500 mL (0 mLs Intravenous Stopped 5/12/25 2027)   predniSONE tablet 40 mg (40 mg Oral Given 5/12/25 2017)   amLODIPine tablet  10 mg (10 mg Oral Given 5/12/25 2017)       New Prescriptions    BENZONATATE (TESSALON) 100 MG CAPSULE    Take 2 capsules (200 mg total) by mouth 3 (three) times daily as needed for Cough.    PREDNISONE (DELTASONE) 20 MG TABLET    Take 2 tablets (40 mg total) by mouth once daily. for 4 days        Follow-up Information       Belkys Sams MD.    Specialty: Emergency Medicine  Contact information:  [id number] [place] [address] [address] Caitlyn [place]IVERA [id number] [phone]                                 Scribe Attestation:   Scribe #1: I performed the above scribed service and the documentation accurately describes the services I performed. I attest to the accuracy of the note.     Attending:   Physician Attestation Statement for Scribe #1: I, Tanner Snider Jr., MD, personally performed the services described in this documentation, as scribed by Alphonso Osorio, in my presence, and it is both accurate and complete.           Clinical Impression       ICD-10-CM ICD-9-CM   1. Allergic rhinitis, unspecified seasonality, unspecified trigger  J30.9 477.9   2. Screening for cardiovascular condition  Z13.6 V81.2   3. Cough, unspecified type  R05.9 786.2   4. Elevated blood pressure reading  R03.0 796.2       Disposition:   Disposition: Discharged  Condition: Stable       Tanner Snider Jr., MD  05/12/25 2052

## 2025-05-17 LAB
BACTERIA BLD CULT: NORMAL
BACTERIA BLD CULT: NORMAL